# Patient Record
Sex: FEMALE | Race: WHITE | NOT HISPANIC OR LATINO | Employment: OTHER | URBAN - METROPOLITAN AREA
[De-identification: names, ages, dates, MRNs, and addresses within clinical notes are randomized per-mention and may not be internally consistent; named-entity substitution may affect disease eponyms.]

---

## 2017-06-17 ENCOUNTER — HOSPITAL ENCOUNTER (OUTPATIENT)
Facility: HOSPITAL | Age: 52
Discharge: HOME | End: 2017-06-18
Attending: EMERGENCY MEDICINE | Admitting: SURGERY
Payer: COMMERCIAL

## 2017-06-17 ENCOUNTER — ANESTHESIA (OUTPATIENT)
Dept: SURGERY | Facility: HOSPITAL | Age: 52
End: 2017-06-17
Payer: COMMERCIAL

## 2017-06-17 ENCOUNTER — ANESTHESIA EVENT (OUTPATIENT)
Dept: SURGERY | Facility: HOSPITAL | Age: 52
End: 2017-06-17
Payer: COMMERCIAL

## 2017-06-17 DIAGNOSIS — K35.80 ACUTE APPENDICITIS: Primary | ICD-10-CM

## 2017-06-17 DIAGNOSIS — K35.30 ACUTE APPENDICITIS WITH LOCALIZED PERITONITIS: ICD-10-CM

## 2017-06-17 LAB
ALBUMIN SERPL BCP-MCNC: 3.8 G/DL
ALP SERPL-CCNC: 66 U/L
ALT SERPL W/O P-5'-P-CCNC: 15 U/L
ANION GAP SERPL CALC-SCNC: 15 MMOL/L
AST SERPL-CCNC: 18 U/L
BASOPHILS # BLD AUTO: 0.02 K/UL
BASOPHILS NFR BLD: 0.1 %
BILIRUB SERPL-MCNC: 1 MG/DL
BILIRUB UR QL STRIP: NEGATIVE
BUN SERPL-MCNC: 10 MG/DL
BUN SERPL-MCNC: 8 MG/DL (ref 6–30)
CALCIUM SERPL-MCNC: 9.6 MG/DL
CHLORIDE SERPL-SCNC: 100 MMOL/L
CHLORIDE SERPL-SCNC: 100 MMOL/L (ref 95–110)
CLARITY UR REFRACT.AUTO: CLEAR
CO2 SERPL-SCNC: 19 MMOL/L
COLOR UR AUTO: YELLOW
CREAT SERPL-MCNC: 0.8 MG/DL (ref 0.5–1.4)
CREAT SERPL-MCNC: 1.1 MG/DL
DIFFERENTIAL METHOD: ABNORMAL
EOSINOPHIL # BLD AUTO: 0 K/UL
EOSINOPHIL NFR BLD: 0.1 %
ERYTHROCYTE [DISTWIDTH] IN BLOOD BY AUTOMATED COUNT: 13.9 %
EST. GFR  (AFRICAN AMERICAN): >60 ML/MIN/1.73 M^2
EST. GFR  (NON AFRICAN AMERICAN): 57.5 ML/MIN/1.73 M^2
GLUCOSE SERPL-MCNC: 160 MG/DL (ref 70–110)
GLUCOSE SERPL-MCNC: 162 MG/DL
GLUCOSE UR QL STRIP: NEGATIVE
HCT VFR BLD AUTO: 38.3 %
HCT VFR BLD CALC: 41 %PCV (ref 36–54)
HGB BLD-MCNC: 13.1 G/DL
HGB UR QL STRIP: ABNORMAL
KETONES UR QL STRIP: ABNORMAL
LACTATE SERPL-SCNC: 1.9 MMOL/L
LEUKOCYTE ESTERASE UR QL STRIP: NEGATIVE
LIPASE SERPL-CCNC: 9 U/L
LYMPHOCYTES # BLD AUTO: 2.4 K/UL
LYMPHOCYTES NFR BLD: 14.8 %
MCH RBC QN AUTO: 31.5 PG
MCHC RBC AUTO-ENTMCNC: 34.2 %
MCV RBC AUTO: 92 FL
MICROSCOPIC COMMENT: NORMAL
MONOCYTES # BLD AUTO: 1.3 K/UL
MONOCYTES NFR BLD: 7.8 %
NEUTROPHILS # BLD AUTO: 12.6 K/UL
NEUTROPHILS NFR BLD: 76.9 %
NITRITE UR QL STRIP: NEGATIVE
PH UR STRIP: 6 [PH] (ref 5–8)
PLATELET # BLD AUTO: 310 K/UL
PMV BLD AUTO: 9.4 FL
POC IONIZED CALCIUM: 1.04 MMOL/L (ref 1.06–1.42)
POC TCO2 (MEASURED): 20 MMOL/L (ref 23–29)
POTASSIUM BLD-SCNC: 3.4 MMOL/L (ref 3.5–5.1)
POTASSIUM SERPL-SCNC: 3.5 MMOL/L
PROT SERPL-MCNC: 7.7 G/DL
PROT UR QL STRIP: NEGATIVE
RBC # BLD AUTO: 4.16 M/UL
RBC #/AREA URNS AUTO: 2 /HPF (ref 0–4)
SAMPLE: ABNORMAL
SODIUM BLD-SCNC: 136 MMOL/L (ref 136–145)
SODIUM SERPL-SCNC: 134 MMOL/L
SP GR UR STRIP: >=1.03 (ref 1–1.03)
SQUAMOUS #/AREA URNS AUTO: 1 /HPF
URN SPEC COLLECT METH UR: ABNORMAL
UROBILINOGEN UR STRIP-ACNC: NEGATIVE EU/DL
WBC # BLD AUTO: 16.31 K/UL
WBC #/AREA URNS AUTO: 2 /HPF (ref 0–5)

## 2017-06-17 PROCEDURE — 85025 COMPLETE CBC W/AUTO DIFF WBC: CPT

## 2017-06-17 PROCEDURE — 99285 EMERGENCY DEPT VISIT HI MDM: CPT | Mod: 25

## 2017-06-17 PROCEDURE — G0378 HOSPITAL OBSERVATION PER HR: HCPCS

## 2017-06-17 PROCEDURE — 96374 THER/PROPH/DIAG INJ IV PUSH: CPT

## 2017-06-17 PROCEDURE — 25000003 PHARM REV CODE 250: Performed by: SURGERY

## 2017-06-17 PROCEDURE — 27000221 HC OXYGEN, UP TO 24 HOURS

## 2017-06-17 PROCEDURE — 71000033 HC RECOVERY, INTIAL HOUR: Performed by: SURGERY

## 2017-06-17 PROCEDURE — 99219 PR INITIAL OBSERVATION CARE,LEVL II: CPT | Mod: 57,,, | Performed by: SURGERY

## 2017-06-17 PROCEDURE — 88304 TISSUE EXAM BY PATHOLOGIST: CPT | Mod: 26,,, | Performed by: PATHOLOGY

## 2017-06-17 PROCEDURE — 83690 ASSAY OF LIPASE: CPT

## 2017-06-17 PROCEDURE — 37000008 HC ANESTHESIA 1ST 15 MINUTES: Performed by: SURGERY

## 2017-06-17 PROCEDURE — 71000039 HC RECOVERY, EACH ADD'L HOUR: Performed by: SURGERY

## 2017-06-17 PROCEDURE — 96375 TX/PRO/DX INJ NEW DRUG ADDON: CPT

## 2017-06-17 PROCEDURE — 36000708 HC OR TIME LEV III 1ST 15 MIN: Performed by: SURGERY

## 2017-06-17 PROCEDURE — 96376 TX/PRO/DX INJ SAME DRUG ADON: CPT

## 2017-06-17 PROCEDURE — 87040 BLOOD CULTURE FOR BACTERIA: CPT

## 2017-06-17 PROCEDURE — 37000009 HC ANESTHESIA EA ADD 15 MINS: Performed by: SURGERY

## 2017-06-17 PROCEDURE — 36000709 HC OR TIME LEV III EA ADD 15 MIN: Performed by: SURGERY

## 2017-06-17 PROCEDURE — 99282 EMERGENCY DEPT VISIT SF MDM: CPT | Mod: ,,, | Performed by: EMERGENCY MEDICINE

## 2017-06-17 PROCEDURE — 83605 ASSAY OF LACTIC ACID: CPT

## 2017-06-17 PROCEDURE — 63600175 PHARM REV CODE 636 W HCPCS: Performed by: SURGERY

## 2017-06-17 PROCEDURE — 63600175 PHARM REV CODE 636 W HCPCS: Performed by: EMERGENCY MEDICINE

## 2017-06-17 PROCEDURE — 80053 COMPREHEN METABOLIC PANEL: CPT

## 2017-06-17 PROCEDURE — 63600175 PHARM REV CODE 636 W HCPCS: Performed by: NURSE ANESTHETIST, CERTIFIED REGISTERED

## 2017-06-17 PROCEDURE — 25000003 PHARM REV CODE 250: Performed by: EMERGENCY MEDICINE

## 2017-06-17 PROCEDURE — 27201423 OPTIME MED/SURG SUP & DEVICES STERILE SUPPLY: Performed by: SURGERY

## 2017-06-17 PROCEDURE — 96361 HYDRATE IV INFUSION ADD-ON: CPT

## 2017-06-17 PROCEDURE — D9220A PRA ANESTHESIA: Mod: ANES,,, | Performed by: ANESTHESIOLOGY

## 2017-06-17 PROCEDURE — D9220A PRA ANESTHESIA: Mod: CRNA,,, | Performed by: NURSE ANESTHETIST, CERTIFIED REGISTERED

## 2017-06-17 PROCEDURE — 44970 LAPAROSCOPY APPENDECTOMY: CPT | Mod: ,,, | Performed by: SURGERY

## 2017-06-17 PROCEDURE — 88304 TISSUE EXAM BY PATHOLOGIST: CPT | Performed by: PATHOLOGY

## 2017-06-17 PROCEDURE — 25500020 PHARM REV CODE 255: Performed by: EMERGENCY MEDICINE

## 2017-06-17 PROCEDURE — 81001 URINALYSIS AUTO W/SCOPE: CPT

## 2017-06-17 PROCEDURE — 25000003 PHARM REV CODE 250: Performed by: NURSE ANESTHETIST, CERTIFIED REGISTERED

## 2017-06-17 RX ORDER — ONDANSETRON 2 MG/ML
4 INJECTION INTRAMUSCULAR; INTRAVENOUS
Status: COMPLETED | OUTPATIENT
Start: 2017-06-17 | End: 2017-06-17

## 2017-06-17 RX ORDER — HYDROMORPHONE HYDROCHLORIDE 1 MG/ML
1 INJECTION, SOLUTION INTRAMUSCULAR; INTRAVENOUS; SUBCUTANEOUS
Status: COMPLETED | OUTPATIENT
Start: 2017-06-17 | End: 2017-06-17

## 2017-06-17 RX ORDER — PROPOFOL 10 MG/ML
VIAL (ML) INTRAVENOUS
Status: DISCONTINUED | OUTPATIENT
Start: 2017-06-17 | End: 2017-06-17

## 2017-06-17 RX ORDER — SODIUM CHLORIDE 9 MG/ML
1000 INJECTION, SOLUTION INTRAVENOUS
Status: DISCONTINUED | OUTPATIENT
Start: 2017-06-17 | End: 2017-06-17

## 2017-06-17 RX ORDER — OXYCODONE AND ACETAMINOPHEN 5; 325 MG/1; MG/1
TABLET ORAL
Status: DISPENSED
Start: 2017-06-17 | End: 2017-06-18

## 2017-06-17 RX ORDER — PROGESTERONE 100 MG/1
100 CAPSULE ORAL DAILY
COMMUNITY

## 2017-06-17 RX ORDER — SODIUM CHLORIDE 9 MG/ML
INJECTION, SOLUTION INTRAVENOUS CONTINUOUS
Status: DISCONTINUED | OUTPATIENT
Start: 2017-06-17 | End: 2017-06-17

## 2017-06-17 RX ORDER — ROCURONIUM BROMIDE 10 MG/ML
INJECTION, SOLUTION INTRAVENOUS
Status: DISCONTINUED | OUTPATIENT
Start: 2017-06-17 | End: 2017-06-17

## 2017-06-17 RX ORDER — ACETAMINOPHEN 10 MG/ML
INJECTION, SOLUTION INTRAVENOUS
Status: DISCONTINUED | OUTPATIENT
Start: 2017-06-17 | End: 2017-06-17

## 2017-06-17 RX ORDER — GLYCOPYRROLATE 0.2 MG/ML
INJECTION INTRAMUSCULAR; INTRAVENOUS
Status: DISCONTINUED | OUTPATIENT
Start: 2017-06-17 | End: 2017-06-17

## 2017-06-17 RX ORDER — SODIUM CHLORIDE 0.9 % (FLUSH) 0.9 %
3 SYRINGE (ML) INJECTION EVERY 8 HOURS
Status: DISCONTINUED | OUTPATIENT
Start: 2017-06-17 | End: 2017-06-17 | Stop reason: HOSPADM

## 2017-06-17 RX ORDER — SODIUM CHLORIDE 0.9 % (FLUSH) 0.9 %
3 SYRINGE (ML) INJECTION
Status: DISCONTINUED | OUTPATIENT
Start: 2017-06-17 | End: 2017-06-17 | Stop reason: HOSPADM

## 2017-06-17 RX ORDER — DEXAMETHASONE SODIUM PHOSPHATE 4 MG/ML
INJECTION, SOLUTION INTRA-ARTICULAR; INTRALESIONAL; INTRAMUSCULAR; INTRAVENOUS; SOFT TISSUE
Status: DISCONTINUED | OUTPATIENT
Start: 2017-06-17 | End: 2017-06-17

## 2017-06-17 RX ORDER — ACETAMINOPHEN 325 MG/1
650 TABLET ORAL EVERY 8 HOURS PRN
Status: DISCONTINUED | OUTPATIENT
Start: 2017-06-17 | End: 2017-06-18 | Stop reason: HOSPADM

## 2017-06-17 RX ORDER — ONDANSETRON 2 MG/ML
INJECTION INTRAMUSCULAR; INTRAVENOUS
Status: DISCONTINUED | OUTPATIENT
Start: 2017-06-17 | End: 2017-06-17

## 2017-06-17 RX ORDER — ONDANSETRON 2 MG/ML
4 INJECTION INTRAMUSCULAR; INTRAVENOUS EVERY 12 HOURS PRN
Status: DISCONTINUED | OUTPATIENT
Start: 2017-06-17 | End: 2017-06-18 | Stop reason: HOSPADM

## 2017-06-17 RX ORDER — PHENYLEPHRINE HYDROCHLORIDE 10 MG/ML
INJECTION INTRAVENOUS
Status: DISCONTINUED | OUTPATIENT
Start: 2017-06-17 | End: 2017-06-17

## 2017-06-17 RX ORDER — SODIUM CHLORIDE 9 MG/ML
INJECTION, SOLUTION INTRAVENOUS CONTINUOUS
Status: DISCONTINUED | OUTPATIENT
Start: 2017-06-17 | End: 2017-06-18 | Stop reason: HOSPADM

## 2017-06-17 RX ORDER — FENTANYL CITRATE 50 UG/ML
25 INJECTION, SOLUTION INTRAMUSCULAR; INTRAVENOUS EVERY 5 MIN PRN
Status: DISCONTINUED | OUTPATIENT
Start: 2017-06-17 | End: 2017-06-17 | Stop reason: HOSPADM

## 2017-06-17 RX ORDER — FENTANYL CITRATE 50 UG/ML
INJECTION, SOLUTION INTRAMUSCULAR; INTRAVENOUS
Status: DISCONTINUED | OUTPATIENT
Start: 2017-06-17 | End: 2017-06-17

## 2017-06-17 RX ORDER — NEOSTIGMINE METHYLSULFATE 1 MG/ML
INJECTION, SOLUTION INTRAVENOUS
Status: DISCONTINUED | OUTPATIENT
Start: 2017-06-17 | End: 2017-06-17

## 2017-06-17 RX ORDER — MIDAZOLAM HYDROCHLORIDE 1 MG/ML
INJECTION, SOLUTION INTRAMUSCULAR; INTRAVENOUS
Status: DISCONTINUED | OUTPATIENT
Start: 2017-06-17 | End: 2017-06-17

## 2017-06-17 RX ORDER — OXYCODONE AND ACETAMINOPHEN 5; 325 MG/1; MG/1
1 TABLET ORAL EVERY 4 HOURS PRN
Status: DISCONTINUED | OUTPATIENT
Start: 2017-06-17 | End: 2017-06-18 | Stop reason: HOSPADM

## 2017-06-17 RX ORDER — SUCCINYLCHOLINE CHLORIDE 20 MG/ML
INJECTION INTRAMUSCULAR; INTRAVENOUS
Status: DISCONTINUED | OUTPATIENT
Start: 2017-06-17 | End: 2017-06-17

## 2017-06-17 RX ORDER — SODIUM CHLORIDE 0.9 % (FLUSH) 0.9 %
3 SYRINGE (ML) INJECTION EVERY 8 HOURS
Status: DISCONTINUED | OUTPATIENT
Start: 2017-06-17 | End: 2017-06-18 | Stop reason: HOSPADM

## 2017-06-17 RX ORDER — HYDROMORPHONE HYDROCHLORIDE 1 MG/ML
0.5 INJECTION, SOLUTION INTRAMUSCULAR; INTRAVENOUS; SUBCUTANEOUS
Status: DISCONTINUED | OUTPATIENT
Start: 2017-06-17 | End: 2017-06-18 | Stop reason: HOSPADM

## 2017-06-17 RX ORDER — LIDOCAINE HCL/PF 100 MG/5ML
SYRINGE (ML) INTRAVENOUS
Status: DISCONTINUED | OUTPATIENT
Start: 2017-06-17 | End: 2017-06-17

## 2017-06-17 RX ORDER — KETOROLAC TROMETHAMINE 30 MG/ML
INJECTION, SOLUTION INTRAMUSCULAR; INTRAVENOUS
Status: DISCONTINUED | OUTPATIENT
Start: 2017-06-17 | End: 2017-06-17

## 2017-06-17 RX ADMIN — ROCURONIUM BROMIDE 20 MG: 10 INJECTION, SOLUTION INTRAVENOUS at 01:06

## 2017-06-17 RX ADMIN — OXYCODONE HYDROCHLORIDE AND ACETAMINOPHEN 1 TABLET: 5; 325 TABLET ORAL at 07:06

## 2017-06-17 RX ADMIN — ROCURONIUM BROMIDE 5 MG: 10 INJECTION, SOLUTION INTRAVENOUS at 01:06

## 2017-06-17 RX ADMIN — DEXAMETHASONE SODIUM PHOSPHATE 4 MG: 4 INJECTION, SOLUTION INTRAMUSCULAR; INTRAVENOUS at 01:06

## 2017-06-17 RX ADMIN — IOHEXOL 75 ML: 350 INJECTION, SOLUTION INTRAVENOUS at 10:06

## 2017-06-17 RX ADMIN — KETOROLAC TROMETHAMINE 30 MG: 30 INJECTION, SOLUTION INTRAMUSCULAR; INTRAVENOUS at 01:06

## 2017-06-17 RX ADMIN — LIDOCAINE HYDROCHLORIDE 100 MG: 20 INJECTION, SOLUTION INTRAVENOUS at 01:06

## 2017-06-17 RX ADMIN — PROPOFOL 120 MG: 10 INJECTION, EMULSION INTRAVENOUS at 01:06

## 2017-06-17 RX ADMIN — HYDROMORPHONE HYDROCHLORIDE 0.5 MG: 1 INJECTION, SOLUTION INTRAMUSCULAR; INTRAVENOUS; SUBCUTANEOUS at 11:06

## 2017-06-17 RX ADMIN — ACETAMINOPHEN 1000 MG: 10 INJECTION, SOLUTION INTRAVENOUS at 01:06

## 2017-06-17 RX ADMIN — ONDANSETRON 4 MG: 2 INJECTION INTRAMUSCULAR; INTRAVENOUS at 09:06

## 2017-06-17 RX ADMIN — SODIUM CHLORIDE: 0.9 INJECTION, SOLUTION INTRAVENOUS at 02:06

## 2017-06-17 RX ADMIN — NEOSTIGMINE METHYLSULFATE 4.4 MG: 1 INJECTION INTRAVENOUS at 01:06

## 2017-06-17 RX ADMIN — SUCCINYLCHOLINE CHLORIDE 100 MG: 20 INJECTION, SOLUTION INTRAMUSCULAR; INTRAVENOUS at 01:06

## 2017-06-17 RX ADMIN — HYDROMORPHONE HYDROCHLORIDE 1 MG: 1 INJECTION, SOLUTION INTRAMUSCULAR; INTRAVENOUS; SUBCUTANEOUS at 11:06

## 2017-06-17 RX ADMIN — OXYCODONE HYDROCHLORIDE AND ACETAMINOPHEN 1 TABLET: 5; 325 TABLET ORAL at 02:06

## 2017-06-17 RX ADMIN — PHENYLEPHRINE HYDROCHLORIDE 200 MCG: 10 INJECTION INTRAVENOUS at 01:06

## 2017-06-17 RX ADMIN — HYDROMORPHONE HYDROCHLORIDE 1 MG: 1 INJECTION, SOLUTION INTRAMUSCULAR; INTRAVENOUS; SUBCUTANEOUS at 09:06

## 2017-06-17 RX ADMIN — SODIUM CHLORIDE 1000 ML: 0.9 INJECTION, SOLUTION INTRAVENOUS at 10:06

## 2017-06-17 RX ADMIN — SODIUM CHLORIDE, SODIUM GLUCONATE, SODIUM ACETATE, POTASSIUM CHLORIDE, MAGNESIUM CHLORIDE, SODIUM PHOSPHATE, DIBASIC, AND POTASSIUM PHOSPHATE: .53; .5; .37; .037; .03; .012; .00082 INJECTION, SOLUTION INTRAVENOUS at 01:06

## 2017-06-17 RX ADMIN — FENTANYL CITRATE 50 MCG: 50 INJECTION, SOLUTION INTRAMUSCULAR; INTRAVENOUS at 01:06

## 2017-06-17 RX ADMIN — ONDANSETRON 4 MG: 2 INJECTION INTRAMUSCULAR; INTRAVENOUS at 01:06

## 2017-06-17 RX ADMIN — PHENYLEPHRINE HYDROCHLORIDE 100 MCG: 10 INJECTION INTRAVENOUS at 01:06

## 2017-06-17 RX ADMIN — MIDAZOLAM HYDROCHLORIDE 2 MG: 1 INJECTION, SOLUTION INTRAMUSCULAR; INTRAVENOUS at 01:06

## 2017-06-17 RX ADMIN — GLYCOPYRROLATE 0.6 MG: 0.2 INJECTION, SOLUTION INTRAMUSCULAR; INTRAVENOUS at 01:06

## 2017-06-17 RX ADMIN — AMPICILLIN SODIUM AND SULBACTAM SODIUM 3 G: 2; 1 INJECTION, POWDER, FOR SOLUTION INTRAMUSCULAR; INTRAVENOUS at 01:06

## 2017-06-17 NOTE — HPI
50 y/o female who presents with 2 day history of abd pain.  This started Thursday night and worsened over the last few days.  Pain mostly in lower abdomen, sometimes in upper.  Non-radiating pain.  Assoc with fevers at home and diarrhea.  No nausea / emesis.  No other issues.

## 2017-06-17 NOTE — CONSULTS
[facility]  General Surgery  Consult Note    Patient Name: Maria A Johnson  MRN: [id number]  Code Status: Full Code  Admission Date: 6/17/2017  Hospital Length of Stay: 0 days  Attending Physician: Joni Kimball, *  Primary Care Provider: No primary care provider on file.    Patient information was obtained from patient.     Inpatient consult to General surgery  Consult performed by: DOMO CAMPOS  Consult ordered by: DOMO CAMPOS        Subjective:     Principal Problem: Acute appendicitis with localized peritonitis    History of Present Illness: 50 y/o female who presents with 2 day history of abd pain.  This started Thursday night and worsened over the last few days.  Pain mostly in lower abdomen, sometimes in upper.  Non-radiating pain.  Assoc with fevers at home and diarrhea.  No nausea / emesis.  No other issues.      No current facility-administered medications on file prior to encounter.      No current outpatient prescriptions on file prior to encounter.       Review of patient's allergies indicates:  No Known Allergies    Past Medical History:   Diagnosis Date    Depression     Migraine headache     Thyroid disease      Past Surgical History:   Procedure Laterality Date    neck fusion       Family History     Problem Relation (Age of Onset)    Cancer Mother    Heart disease Sister, Brother        Social History Main Topics    Smoking status: Never Smoker    Smokeless tobacco: Not on file    Alcohol use Yes      Comment: 3 drinks/week    Drug use: No    Sexual activity: Not on file     Review of Systems   Constitutional: Positive for fever.   Respiratory: Negative for cough and shortness of breath.    Cardiovascular: Negative for chest pain and palpitations.   Gastrointestinal: Positive for abdominal pain and diarrhea. Negative for nausea and vomiting.   Genitourinary: Negative for dysuria and hematuria.   Skin: Negative for pallor.   Psychiatric/Behavioral:  Negative for behavioral problems.     Objective:     Vital Signs (Most Recent):  Temp: 98.7 °F (37.1 °C) (06/17/17 0906)  Pulse: 98 (06/17/17 1201)  Resp: (!) 24 (06/17/17 1201)  BP: (!) 98/52 (06/17/17 1201)  SpO2: 96 % (06/17/17 1201) Vital Signs (24h Range):  Temp:  [98.7 °F (37.1 °C)] 98.7 °F (37.1 °C)  Pulse:  [69-98] 98  Resp:  [16-24] 24  SpO2:  [91 %-100 %] 96 %  BP: ()/(52-66) 98/52     Weight: 63.5 kg (140 lb)  Body mass index is 26.45 kg/m².    Physical Exam   Constitutional: She is oriented to person, place, and time. She appears well-developed and well-nourished. No distress.   HENT:   Head: Normocephalic and atraumatic.   Cardiovascular: Normal rate, regular rhythm and normal heart sounds.    Pulmonary/Chest: Effort normal and breath sounds normal.   Abdominal: Soft. She exhibits no distension. There is tenderness (ttp in medial rlq).   Neurological: She is alert and oriented to person, place, and time.   Skin: Skin is warm.   Psychiatric: She has a normal mood and affect. Her behavior is normal.   Vitals reviewed.      Significant Labs:  CBC:   Recent Labs  Lab 06/17/17  0940 06/17/17  0942   WBC 16.31*  --    RBC 4.16  --    HGB 13.1  --    HCT 38.3 41     --    MCV 92  --    MCH 31.5*  --    MCHC 34.2  --      CMP:   Recent Labs  Lab 06/17/17  0940   *   CALCIUM 9.6   ALBUMIN 3.8   PROT 7.7   *   K 3.5   CO2 19*      BUN 10   CREATININE 1.1   ALKPHOS 66   ALT 15   AST 18   BILITOT 1.0     Coagulation: No results for input(s): INR, APTT in the last 168 hours.    Invalid input(s): PT    Significant Diagnostics:  CT: I have reviewed all pertinent results/findings within the past 24 hours and my personal findings are:  acute appendicitis    Assessment/Plan:     * Acute appendicitis with localized peritonitis    Admit to Surgery  NPO, IVF  IV abx  To OR for lap appy  Consent obtained.          VTE Risk Mitigation         Ordered     Medium Risk of VTE  Once      06/17/17  1202     Place sequential compression device  Until discontinued      06/17/17 1202     Place ANG hose  Until discontinued      06/17/17 1202          Thank you for your consult.     Pito Rosado MD  General Surgery  [facility]    I have personally performed a detailed history and physical examination on this patient. My findings are summarized in the resident's note included in the record.  Decision made to proceed urgently to surgery for lap appy

## 2017-06-17 NOTE — NURSING TRANSFER
Nursing Transfer Note      6/17/2017     Transfer To: OBS 10    Transfer via stretcher    Transfer with IV fluids    Transported by PCT    Medicines sent: none    Chart send with patient: Yes    Notified: spouse    Patient reassessed at: 2545

## 2017-06-17 NOTE — OP NOTE
DATE OF PROCEDURE:  06/17/2017    PREOPERATIVE DIAGNOSIS:  Acute appendicitis.    POSTOPERATIVE DIAGNOSIS:  Acute appendicitis.    PROCEDURE:  Laparoscopic appendectomy.    SURGEON:  Subhash Thornton M.D.    ASSISTANT:  Pito Rosado M.D. (M.D. (RES).    BLOOD LOSS:  Minimal.    COMPLICATIONS:  None.    INDICATIONS:  A 51-year-old with radiographic and clinical evidence of acute   appendicitis.    OPERATIVE FINDINGS:  The patient was nonperforated, but had purulence in the   pelvis and right lower abdomen.    OPERATIVE REPORT IN DETAIL:  The patient was brought to the Operating Room and   placed in the supine position, prepped and draped in sterile fashion.  After   satisfactory general anesthesia was induced, and preoperatively a Booker catheter   was placed to decompress the bladder, incision was made at the base of the   umbilicus.  The peritoneal cavity was uneventfully entered under direct vision.    A pursestring suture of 0 Vicryl was placed.  The Bubba cannula was inserted   through the suture.  Pneumoperitoneum was created.  A 5 mm trocar was placed in   the suprapubic area and then in the left lower quadrant.  The patient was placed   in Trendelenburg.  The base of the cecum was identified, the tenia were   followed to the origin of the appendix.  The appendix was traced distally,   grasped with a nonlocking grasper, placed on stretch and a window between the   mesoappendix and the base of the appendix was created with a [place] retractor.    A RAFAELA stapler was fired across the base of the appendix and then a vascular   load was inserted in the stapler and this was used to control the mesoappendix.    The appendix was placed in an EndoCatch bag, which was removed through the   umbilical trocar under the visualization of a 5 mm scope placed in the left   lower quadrant trocar.  The appendix was uneventfully removed.  The 10 mm scope   was reintroduced.  The pelvis was irrigated.  Purulence was able to be  aspirated   and once the irrigant was clear, the procedure was terminated.  The trocars   were removed under direct vision.  The umbilical fascial defect was closed   securely by tying the pursestring suture and all 3 trocar sites were closed with   a subcuticular stitch.  Needle, sponge and instrument counts were correct.  The   patient tolerated the procedure well and was stable at the completion of the   operation.      MARIA LUZ/RISHI  dd: 06/17/2017 13:46:51 (CDT)  td: 06/17/2017 16:59:11 (CDT)  Doc ID   #[id number]  Job ID #[id number]    CC:

## 2017-06-17 NOTE — PROGRESS NOTES
Pt arrived to OBS Rm 10 from PACU. Pt ambulated from stretcher to bed with steady gait.  Pt denies pain or discomfort. Respirations even and nonlabored. +2 pulses. Steri strips to abdomen c/d/I . Pt oriented to Rm and diet menu.

## 2017-06-17 NOTE — TRANSFER OF CARE
"Anesthesia Transfer of Care Note    Patient: Maria A Johnson    Procedure(s) Performed: Procedure(s) (LRB):  APPENDECTOMY-LAPAROSCOPIC (N/A)    Patient location: PACU    Anesthesia Type: general    Transport from OR: Transported from OR on 6-10 L/min O2 by face mask with adequate spontaneous ventilation    Post pain: adequate analgesia    Post assessment: no apparent anesthetic complications and tolerated procedure well    Post vital signs: stable    Level of consciousness: awake, alert and oriented    Nausea/Vomiting: no nausea/vomiting    Complications: none    Transfer of care protocol was followed      Last vitals:   Visit Vitals  BP (!) 107/51   Pulse 90   Temp 37.9 °C (100.3 °F) (Axillary)   Resp 16   Ht 5' 1" (1.549 m)   Wt 63.5 kg (140 lb)   SpO2 100%   BMI 26.45 kg/m²     "

## 2017-06-17 NOTE — PLAN OF CARE
Problem: Patient Care Overview  Goal: Plan of Care Review  Outcome: Ongoing (interventions implemented as appropriate)  POC reviewed with pt and spouse. Safety precautions maintained. Bed in low position, wheels locked. Side rails up x 2. Call light within reach. Pt free of falls.

## 2017-06-17 NOTE — BRIEF OP NOTE
[facility]  Brief Operative Note    SUMMARY     Surgery Date: 6/17/2017     Surgeon(s) and Role:     * Subhash Thornton MD - Primary     * Pito Rosado MD - Resident Assisting    Assisting Surgeon: None    Pre-op Diagnosis:  Acute appendicitis with localized peritonitis [K35.3]    Post-op Diagnosis:  Post-Op Diagnosis Codes:     * Acute appendicitis with localized peritonitis [K35.3]    Procedure(s) (LRB):  APPENDECTOMY-LAPAROSCOPIC (N/A)    Anesthesia: General    Description of Procedure: see op note    Description of the findings of the procedure: acute, non-perforated appendicitis    Estimated Blood Loss: 5 mL         Specimens:   Specimen (12h ago through future)    Start     Ordered    06/17/17 1345  Specimen to Pathology - Surgery  Once     Comments:  Appendix      06/17/17 1476

## 2017-06-17 NOTE — ANESTHESIA RELEASE NOTE
"Anesthesia Release from PACU Note    Patient: Maria A Johnson    Procedure(s) Performed: Procedure(s) (LRB):  APPENDECTOMY-LAPAROSCOPIC (N/A)    Anesthesia type: general    Post pain: Adequate analgesia    Post assessment: no apparent anesthetic complications, tolerated procedure well and no evidence of recall    Last Vitals:   Visit Vitals  BP (!) 97/53   Pulse 83   Temp 37.1 °C (98.8 °F) (Oral)   Resp 14   Ht 5' 1" (1.549 m)   Wt 63.5 kg (140 lb)   SpO2 95%   BMI 26.45 kg/m²       Post vital signs: stable    Level of consciousness: awake, alert  and oriented    Nausea/Vomiting: no nausea/no vomiting    Complications: none    Airway Patency: patent    Respiratory: unassisted, spontaneous ventilation, room air    Cardiovascular: stable and blood pressure at baseline    Hydration: euvolemic  "

## 2017-06-17 NOTE — ED NOTES
Attempted report to nurse on Obs. This nurse's callback # [phone]. Pt rings, clothes, all belongings given to family. No belongings sent with pt to sx.

## 2017-06-17 NOTE — ED TRIAGE NOTES
"Pt is lying on stretcher in fetal position with eyes closed cringing in discomfort.  Pt reports abdominal pain since yesterday. Has progressively gotten worse. Abdominal pain present to all quadrants, worse to lower quadrants, feels like "I'm going to have a baby". Small amount of diarrhea, no vomiting, some nausea.     Yesterday ate cheese, strawberries and grapes. Drank a bottle of water, states she didn't have much of an appetite.  Took no medications at home.  "

## 2017-06-17 NOTE — ANESTHESIA PREPROCEDURE EVALUATION
06/17/2017  Maria A Johnson is a 51 y.o., female.    Active Ambulatory Problems     Diagnosis Date Noted    No Active Ambulatory Problems     Resolved Ambulatory Problems     Diagnosis Date Noted    No Resolved Ambulatory Problems     Past Medical History:   Diagnosis Date    Depression     Migraine headache     Thyroid disease          Anesthesia Evaluation    I have reviewed the Patient Summary Reports.    I have reviewed the Nursing Notes.      Review of Systems  Anesthesia Hx:  Denies Hx of Anesthetic complications    Social:  Non-Smoker    Cardiovascular:   Exercise tolerance: good Denies CAD.     Denies Angina.        Pulmonary:   Denies COPD.  Denies Asthma.  Denies Shortness of breath.  Denies Sleep Apnea.    Renal/:   Denies Chronic Renal Disease.     Hepatic/GI:   Denies GERD. Denies Liver Disease.    Neurological:   Headaches    Endocrine:   Denies Diabetes. Hypothyroidism    Psych:   depression          Physical Exam  General:  Well nourished    Airway/Jaw/Neck:  Airway Findings: Mouth Opening: Normal Tongue: Normal  General Airway Assessment: Adult, Good  Mallampati: I  Improves to I with phonation.  TM Distance: Normal, at least 6 cm  Jaw/Neck Findings:     Neck ROM: Normal ROM      Dental:  Dental Findings: In tact   Chest/Lungs:  Chest/Lungs Findings: Clear to auscultation     Heart/Vascular:  Heart Findings: Rate: Normal  Heart murmur: negative Vascular Findings: Normal    Abdomen:  Abdomen Findings: Normal    Musculoskeletal:  Musculoskeletal Findings: Normal   Skin:  Skin Findings: Normal    Mental Status:  Mental Status Findings:  Cooperative, Alert and Oriented         Anesthesia Plan  Type of Anesthesia, risks & benefits discussed:  Anesthesia Type:  general  Patient's Preference: GA  Intra-op Monitoring Plan: standard ASA monitors  Intra-op Monitoring Plan Comments:   Post Op  Pain Control Plan: multimodal analgesia, IV/PO Opiods PRN and per primary service following discharge from PACU  Post Op Pain Control Plan Comments:   Induction:   IV  Beta Blocker:  Patient is not currently on a Beta-Blocker (No further documentation required).       Informed Consent: Patient understands risks and agrees with Anesthesia plan.  Questions answered. Anesthesia consent signed with patient.  ASA Score: 2  emergent   Day of Surgery Review of History & Physical:    H&P update referred to the surgeon.     Anesthesia Plan Notes: The patient's PMH was reviewed and PE was performed  Plan for GA        Ready For Surgery From Anesthesia Perspective.

## 2017-06-17 NOTE — SUBJECTIVE & OBJECTIVE
No current facility-administered medications on file prior to encounter.      No current outpatient prescriptions on file prior to encounter.       Review of patient's allergies indicates:  No Known Allergies    Past Medical History:   Diagnosis Date    Depression     Migraine headache     Thyroid disease      Past Surgical History:   Procedure Laterality Date    neck fusion       Family History     Problem Relation (Age of Onset)    Cancer Mother    Heart disease Sister, Brother        Social History Main Topics    Smoking status: Never Smoker    Smokeless tobacco: Not on file    Alcohol use Yes      Comment: 3 drinks/week    Drug use: No    Sexual activity: Not on file     Review of Systems   Constitutional: Positive for fever.   Respiratory: Negative for cough and shortness of breath.    Cardiovascular: Negative for chest pain and palpitations.   Gastrointestinal: Positive for abdominal pain and diarrhea. Negative for nausea and vomiting.   Genitourinary: Negative for dysuria and hematuria.   Skin: Negative for pallor.   Psychiatric/Behavioral: Negative for behavioral problems.     Objective:     Vital Signs (Most Recent):  Temp: 98.7 °F (37.1 °C) (06/17/17 0906)  Pulse: 98 (06/17/17 1201)  Resp: (!) 24 (06/17/17 1201)  BP: (!) 98/52 (06/17/17 1201)  SpO2: 96 % (06/17/17 1201) Vital Signs (24h Range):  Temp:  [98.7 °F (37.1 °C)] 98.7 °F (37.1 °C)  Pulse:  [69-98] 98  Resp:  [16-24] 24  SpO2:  [91 %-100 %] 96 %  BP: ()/(52-66) 98/52     Weight: 63.5 kg (140 lb)  Body mass index is 26.45 kg/m².    Physical Exam   Constitutional: She is oriented to person, place, and time. She appears well-developed and well-nourished. No distress.   HENT:   Head: Normocephalic and atraumatic.   Cardiovascular: Normal rate, regular rhythm and normal heart sounds.    Pulmonary/Chest: Effort normal and breath sounds normal.   Abdominal: Soft. She exhibits no distension. There is tenderness (ttp in medial rlq).    Neurological: She is alert and oriented to person, place, and time.   Skin: Skin is warm.   Psychiatric: She has a normal mood and affect. Her behavior is normal.   Vitals reviewed.      Significant Labs:  CBC:   Recent Labs  Lab 06/17/17  0940 06/17/17  0942   WBC 16.31*  --    RBC 4.16  --    HGB 13.1  --    HCT 38.3 41     --    MCV 92  --    MCH 31.5*  --    MCHC 34.2  --      CMP:   Recent Labs  Lab 06/17/17  0940   *   CALCIUM 9.6   ALBUMIN 3.8   PROT 7.7   *   K 3.5   CO2 19*      BUN 10   CREATININE 1.1   ALKPHOS 66   ALT 15   AST 18   BILITOT 1.0     Coagulation: No results for input(s): INR, APTT in the last 168 hours.    Invalid input(s): PT    Significant Diagnostics:  CT: I have reviewed all pertinent results/findings within the past 24 hours and my personal findings are:  acute appendicitis

## 2017-06-17 NOTE — ED PROVIDER NOTES
Encounter Date: 6/17/2017    SCRIBE #1 NOTE: I, Nia Lewis, am scribing for, and in the presence of, Dr. Kimball.       History     Chief Complaint   Patient presents with    Abdominal Pain     Pt c/o abdominal pain that began yesterday.  Worse today.     Review of patient's allergies indicates:  No Known Allergies  Time patient was seen by the provider: 9:29 AM      The patient is a 51 y.o. female with hx of: depression, migraines, thyroid disease that presents to the ED with a complaint of abdominal pain. Pt states state she felt bloated and distended yesterday and had subsequent onset of abdominal pain (pressure), worsening today. Pain does not radiate to the back. Pain worsened while driving over bumps on the way to the ED. She reports liquid bowel movements. Last normal BM 2 days ago. No urinary changes, vaginal discharge, vaginal bleeding. 2 pregnancies in the past. NKDA          Past Medical History:   Diagnosis Date    Depression     Migraine headache     Thyroid disease      Past Surgical History:   Procedure Laterality Date    neck fusion       Family History   Problem Relation Age of Onset    Cancer Mother     Heart disease Sister     Heart disease Brother      Social History     Tobacco Use    Smoking status: Never Smoker   Substance Use Topics    Alcohol use: Yes     Comment: 3 drinks/week    Drug use: No     Review of Systems   Constitutional: Positive for activity change, appetite change, chills and fever.   HENT: Negative for sore throat and trouble swallowing.    Eyes: Negative for photophobia and visual disturbance.   Respiratory: Negative for chest tightness and shortness of breath.    Cardiovascular: Negative for chest pain.   Gastrointestinal: Positive for abdominal pain and nausea. Negative for vomiting.       Physical Exam     Initial Vitals [06/17/17 0906]   BP Pulse Resp Temp SpO2   (!) 89/54 89 18 98.7 °F (37.1 °C) 99 %     Physical Exam    Nursing note and vitals  reviewed.  Constitutional: She appears ill.   HENT:   Head: Normocephalic.   Mouth/Throat: Mucous membranes are dry.   Eyes: No scleral icterus.   Neck: Normal range of motion. Neck supple.   Cardiovascular: Normal rate, regular rhythm and normal heart sounds.   Pulmonary/Chest: No accessory muscle usage. No tachypnea. No respiratory distress.   Abdominal: She exhibits no distension. There is tenderness in the right lower quadrant.   Musculoskeletal: Normal range of motion.   Neurological: She has normal strength. No cranial nerve deficit. GCS eye subscore is 4. GCS verbal subscore is 5. GCS motor subscore is 6.   Skin: Skin is warm and dry.         ED Course   Procedures  Labs Reviewed   CBC W/ AUTO DIFFERENTIAL - Abnormal; Notable for the following components:       Result Value    WBC 16.31 (*)     Mean Corpuscular Hemoglobin 31.5 (*)     Gran # (ANC) 12.6 (*)     Mono # 1.3 (*)     Gran% 76.9 (*)     Lymph% 14.8 (*)     All other components within normal limits   COMPREHENSIVE METABOLIC PANEL - Abnormal; Notable for the following components:    Sodium 134 (*)     CO2 19 (*)     Glucose 162 (*)     eGFR if non  57.5 (*)     All other components within normal limits   URINALYSIS - Abnormal; Notable for the following components:    Specific Gravity, UA >=1.030 (*)     Ketones, UA Trace (*)     Occult Blood UA 2+ (*)     All other components within normal limits   ISTAT PROCEDURE - Abnormal; Notable for the following components:    POC Glucose 160 (*)     POC Potassium 3.4 (*)     POC TCO2 (MEASURED) 20 (*)     POC Ionized Calcium 1.04 (*)     All other components within normal limits   LIPASE   LACTIC ACID, PLASMA   URINALYSIS MICROSCOPIC          X-Rays:   Independently Interpreted Readings:   Abdomen: Resident Prelim-findings compatible with acute appendicitis--discussed with Dr. Kimball     Medical Decision Making:   History:   Old Medical Records: I decided to obtain old medical  records.  Initial Assessment:   Patient evaluated in the ED because of onset of abdominal pain today yesterday he felt poorly but today the abdominal discomfort was quite significant which has evolved into an acute abdomen patient showing significant peritoneal signs  Differential Diagnosis:   Acute abdomen  Clinical Tests:   Lab Tests: Ordered and Reviewed  Radiological Study: Ordered and Reviewed  Medical Tests: Ordered and Reviewed  ED Management:  We'll do labs including abdominal labs lactic acid will do CT of abdomen and pelvis with IV contrast will aggressively hydrate the patient will control the pain            Scribe Attestation:   Scribe #1: I performed the above scribed service and the documentation accurately describes the services I performed. I attest to the accuracy of the note.    Attending Attestation:           Physician Attestation for Scribe:  Physician Attestation Statement for Scribe #1: I, Dr. Kimball, reviewed documentation, as scribed by Nia Lewis in my presence, and it is both accurate and complete.         Attending ED Notes:   Patient presenting to the ED with an acute abdomen patient evaluated in the ED he was aggressively hydrated pain was controlled CT of the abdomen and pelvis with IV contrast showed acute appendicitis there is no perforation general surgery with consultation the patient was admitted for surgery and taken to the OR for an appendectomy             Clinical Impression:   The primary encounter diagnosis was Acute appendicitis. A diagnosis of Acute appendicitis with localized peritonitis was also pertinent to this visit.    Disposition:   Disposition: Admitted  Condition: Serious       Joni Kimball MD  06/24/17 0156       Joni Kimball MD  11/28/19 9601

## 2017-06-17 NOTE — ANESTHESIA POSTPROCEDURE EVALUATION
"Anesthesia Post Evaluation    Patient: Maria A Johnson    Procedure(s) Performed: Procedure(s) (LRB):  APPENDECTOMY-LAPAROSCOPIC (N/A)    Final Anesthesia Type: general  Patient location during evaluation: PACU  Patient participation: Yes- Able to Participate  Level of consciousness: awake and alert  Post-procedure vital signs: reviewed and stable  Pain management: adequate  Airway patency: patent  PONV status at discharge: No PONV  Anesthetic complications: no      Cardiovascular status: blood pressure returned to baseline and hemodynamically stable  Respiratory status: unassisted, spontaneous ventilation and room air  Hydration status: euvolemic  Follow-up not needed.        Visit Vitals  BP (!) 97/53   Pulse 83   Temp 37.1 °C (98.8 °F) (Oral)   Resp 14   Ht 5' 1" (1.549 m)   Wt 63.5 kg (140 lb)   SpO2 95%   BMI 26.45 kg/m²       Pain/Octavio Score: Pain Assessment Performed: Yes (6/17/2017  3:15 PM)  Presence of Pain: denies (6/17/2017  3:15 PM)  Pain Rating Prior to Med Admin: 4 (6/17/2017  2:37 PM)  Octavio Score: 10 (6/17/2017  3:15 PM)      "

## 2017-06-18 VITALS
BODY MASS INDEX: 26.43 KG/M2 | HEART RATE: 63 BPM | HEIGHT: 61 IN | DIASTOLIC BLOOD PRESSURE: 54 MMHG | SYSTOLIC BLOOD PRESSURE: 100 MMHG | RESPIRATION RATE: 18 BRPM | WEIGHT: 140 LBS | OXYGEN SATURATION: 95 % | TEMPERATURE: 98 F

## 2017-06-18 LAB
ANION GAP SERPL CALC-SCNC: 8 MMOL/L
BASOPHILS # BLD AUTO: 0.01 K/UL
BASOPHILS NFR BLD: 0.1 %
BUN SERPL-MCNC: 10 MG/DL
CALCIUM SERPL-MCNC: 8 MG/DL
CHLORIDE SERPL-SCNC: 107 MMOL/L
CO2 SERPL-SCNC: 21 MMOL/L
CREAT SERPL-MCNC: 0.8 MG/DL
DIFFERENTIAL METHOD: ABNORMAL
EOSINOPHIL # BLD AUTO: 0 K/UL
EOSINOPHIL NFR BLD: 0 %
ERYTHROCYTE [DISTWIDTH] IN BLOOD BY AUTOMATED COUNT: 14.4 %
EST. GFR  (AFRICAN AMERICAN): >60 ML/MIN/1.73 M^2
EST. GFR  (NON AFRICAN AMERICAN): >60 ML/MIN/1.73 M^2
GLUCOSE SERPL-MCNC: 112 MG/DL
HCT VFR BLD AUTO: 34.6 %
HGB BLD-MCNC: 11.3 G/DL
LYMPHOCYTES # BLD AUTO: 1 K/UL
LYMPHOCYTES NFR BLD: 5.6 %
MAGNESIUM SERPL-MCNC: 2.1 MG/DL
MCH RBC QN AUTO: 31.1 PG
MCHC RBC AUTO-ENTMCNC: 32.7 %
MCV RBC AUTO: 95 FL
MONOCYTES # BLD AUTO: 0.8 K/UL
MONOCYTES NFR BLD: 4.5 %
NEUTROPHILS # BLD AUTO: 15.9 K/UL
NEUTROPHILS NFR BLD: 89.6 %
PHOSPHATE SERPL-MCNC: 2.8 MG/DL
PLATELET # BLD AUTO: 289 K/UL
PMV BLD AUTO: 9.8 FL
POTASSIUM SERPL-SCNC: 3.8 MMOL/L
RBC # BLD AUTO: 3.63 M/UL
SODIUM SERPL-SCNC: 136 MMOL/L
WBC # BLD AUTO: 17.77 K/UL

## 2017-06-18 PROCEDURE — G0378 HOSPITAL OBSERVATION PER HR: HCPCS

## 2017-06-18 PROCEDURE — 36415 COLL VENOUS BLD VENIPUNCTURE: CPT

## 2017-06-18 PROCEDURE — 83735 ASSAY OF MAGNESIUM: CPT

## 2017-06-18 PROCEDURE — 80048 BASIC METABOLIC PNL TOTAL CA: CPT

## 2017-06-18 PROCEDURE — 85025 COMPLETE CBC W/AUTO DIFF WBC: CPT

## 2017-06-18 PROCEDURE — 25000003 PHARM REV CODE 250: Performed by: SURGERY

## 2017-06-18 PROCEDURE — 84100 ASSAY OF PHOSPHORUS: CPT

## 2017-06-18 RX ORDER — POLYETHYLENE GLYCOL 3350 17 G/17G
17 POWDER, FOR SOLUTION ORAL DAILY
Qty: 119 G | Refills: 0 | Status: SHIPPED | OUTPATIENT
Start: 2017-06-18

## 2017-06-18 RX ORDER — OXYCODONE AND ACETAMINOPHEN 5; 325 MG/1; MG/1
TABLET ORAL
Qty: 21 TABLET | Refills: 0 | Status: SHIPPED | OUTPATIENT
Start: 2017-06-18 | End: 2017-06-18

## 2017-06-18 RX ORDER — OXYCODONE AND ACETAMINOPHEN 5; 325 MG/1; MG/1
TABLET ORAL
Qty: 21 TABLET | Refills: 0 | Status: SHIPPED | OUTPATIENT
Start: 2017-06-18

## 2017-06-18 RX ADMIN — SODIUM CHLORIDE, SODIUM LACTATE, POTASSIUM CHLORIDE, AND CALCIUM CHLORIDE 1000 ML: .6; .31; .03; .02 INJECTION, SOLUTION INTRAVENOUS at 08:06

## 2017-06-18 NOTE — NURSING
Pt discharged home. VSS. AVS reviewed and understanding verbalized by patient. Prescriptions given. Steri strips intact. IV removed, tip intact. Pt waiting for escort to arrive.

## 2017-06-22 LAB
BACTERIA BLD CULT: NORMAL
BACTERIA BLD CULT: NORMAL

## 2022-09-09 NOTE — DISCHARGE SUMMARY
[facility]  General Surgery  Discharge Summary      Patient Name: Maria A Johnson  MRN: [id number]  Admission Date: 6/17/2017  Hospital Length of Stay: 0 days  Discharge Date and Time:  06/18/2017 11:12 AM  Attending Physician: ARNOLDO Thornton  Discharging Provider: Trista Zuniga MD  Primary Care Provider: No primary care provider on file.     HPI: Admitted for acute appendicitis.    Procedure(s) (LRB):  APPENDECTOMY-LAPAROSCOPIC (N/A)     Hospital Course: Pt tolerated procedure well and had an uncomplicated post op course.      Consults:   Consults         Status Ordering Provider     Inpatient consult to General surgery  Once     Provider:  (Not yet assigned)    Completed JOSE A OLSEN          Significant Diagnostic Studies: see chart    Pending Diagnostic Studies:     None        Final Active Diagnoses:    Diagnosis Date Noted POA    PRINCIPAL PROBLEM:  Acute appendicitis with localized peritonitis [K35.3] 06/17/2017 Yes    Acute appendicitis [K35.80] 06/17/2017 Yes      Problems Resolved During this Admission:    Diagnosis Date Noted Date Resolved POA      Discharged Condition: good    Disposition: Home or Self Care    Follow Up:  Follow-up Information     Call Subhash Thornton MD.    Specialties:  General Surgery, Surgery  Why:  As needed; follow up if able in 2 weeks  Contact information:  [address] [place] [address] [id number] [phone]                 Patient Instructions:     Diet general     Activity as tolerated     Shower on day dressing removed (No bath)   Order Comments: No bathing or swimming. Shower with normal soap and water.     Lifting restrictions   Order Comments: No lifting greater than 10 pounds for 2 weeks from day of surgery.  No pushing/pulling such as vacuuming or raking.  No straining, avoid constipation and take stool softeners as described and laxatives as needed.  No driving while on narcotics and until you can react quickly without pain.     Call MD  for:  temperature >100.4     Call MD for:  persistent nausea and vomiting or diarrhea     Call MD for:  severe uncontrolled pain     Call MD for:  redness, tenderness, or signs of infection (pain, swelling, redness, odor or green/yellow discharge around incision site)     Call MD for:  difficulty breathing or increased cough     Call MD for:  severe persistent headache     Call MD for:  worsening rash     Call MD for:  persistent dizziness, light-headedness, or visual disturbances     Call MD for:  increased confusion or weakness     Remove dressing in 24 hours   Order Comments: Steri strips will fall off on their own       Medications:  Reconciled Home Medications:   Discharge Medication List as of 6/18/2017 10:36 AM      START taking these medications    Details   polyethylene glycol (GLYCOLAX) 17 gram/dose powder Take 17 g by mouth once daily., Starting Sun 6/18/2017, Print         CONTINUE these medications which have CHANGED    Details   oxycodone-acetaminophen (PERCOCET) 5-325 mg per tablet Take one to two tablets by mouth every four hours as needed for pain, Print         CONTINUE these medications which have NOT CHANGED    Details   FLUOXETINE HCL (FLUOXETINE ORAL) Take by mouth., Historical Med      LEVOTHYROXINE SODIUM (SYNTHROID ORAL) Take by mouth., Historical Med      multivitamin capsule Take 1 capsule by mouth once daily., Historical Med      progesterone (PROMETRIUM) 100 MG capsule Take 100 mg by mouth once daily., Historical Med             Trista Zuniga MD  General Surgery  [facility]   fair balance

## (undated) DEVICE — STAPLER INT LINEAR ARTC 3.5-45

## (undated) DEVICE — CART STAPLE RELD 45MM WHT

## (undated) DEVICE — SOL NS 1000CC

## (undated) DEVICE — TRAY MINOR GEN SURG

## (undated) DEVICE — SUT 0 VICRYL / UR6 (J603)

## (undated) DEVICE — ELECTRODE REM PLYHSV RETURN 9

## (undated) DEVICE — SOL IRR NACL .9% 3000ML

## (undated) DEVICE — DRAPE ABDOMINAL TIBURON 14X11

## (undated) DEVICE — KIT ANTIFOG

## (undated) DEVICE — SUT CTD VICRYL 4-0 PS-4 UND

## (undated) DEVICE — TUBING HF INSUFFLATION W/ FLTR

## (undated) DEVICE — CART STAPLE FLEX ETX 3.5MM BLU